# Patient Record
Sex: MALE | Race: WHITE | NOT HISPANIC OR LATINO | URBAN - METROPOLITAN AREA
[De-identification: names, ages, dates, MRNs, and addresses within clinical notes are randomized per-mention and may not be internally consistent; named-entity substitution may affect disease eponyms.]

---

## 2023-09-08 ENCOUNTER — OFFICE VISIT (OUTPATIENT)
Dept: URBAN - METROPOLITAN AREA SURGERY CENTER 15 | Facility: SURGERY CENTER | Age: 48
End: 2023-09-08
Payer: COMMERCIAL

## 2023-09-08 ENCOUNTER — CLAIMS CREATED FROM THE CLAIM WINDOW (OUTPATIENT)
Dept: URBAN - METROPOLITAN AREA CLINIC 4 | Facility: CLINIC | Age: 48
End: 2023-09-08
Payer: COMMERCIAL

## 2023-09-08 DIAGNOSIS — D12.3 ADENOMA OF TRANSVERSE COLON: ICD-10-CM

## 2023-09-08 DIAGNOSIS — Z12.11 COLON CANCER SCREENING: ICD-10-CM

## 2023-09-08 DIAGNOSIS — K63.89 OTHER SPECIFIED DISEASES OF INTESTINE: ICD-10-CM

## 2023-09-08 DIAGNOSIS — K63.5 BENIGN COLON POLYPS: ICD-10-CM

## 2023-09-08 DIAGNOSIS — K63.89 APPENDICITIS EPIPLOICA: ICD-10-CM

## 2023-09-08 DIAGNOSIS — K64.4 INTERNAL AND EXTERNAL HEMORRHOIDS WITHOUT COMPLICATION: ICD-10-CM

## 2023-09-08 DIAGNOSIS — Z12.11 COLON CANCER SCREENING (HIGH RISK): ICD-10-CM

## 2023-09-08 DIAGNOSIS — K62.1 ANAL AND RECTAL POLYP: ICD-10-CM

## 2023-09-08 PROCEDURE — G8907 PT DOC NO EVENTS ON DISCHARG: HCPCS | Performed by: INTERNAL MEDICINE

## 2023-09-08 PROCEDURE — 88305 TISSUE EXAM BY PATHOLOGIST: CPT | Performed by: PATHOLOGY

## 2023-09-08 PROCEDURE — 45385 COLONOSCOPY W/LESION REMOVAL: CPT | Performed by: INTERNAL MEDICINE

## 2023-09-08 PROCEDURE — 45384 COLONOSCOPY W/LESION REMOVAL: CPT | Performed by: INTERNAL MEDICINE

## 2023-09-08 PROCEDURE — 45381 COLONOSCOPY SUBMUCOUS NJX: CPT | Performed by: INTERNAL MEDICINE

## 2023-09-08 PROCEDURE — 00811 ANES LWR INTST NDSC NOS: CPT | Performed by: NURSE ANESTHETIST, CERTIFIED REGISTERED

## 2025-03-24 ENCOUNTER — OFFICE VISIT (OUTPATIENT)
Dept: URBAN - METROPOLITAN AREA CLINIC 78 | Facility: CLINIC | Age: 50
End: 2025-03-24
Payer: COMMERCIAL

## 2025-03-24 ENCOUNTER — DASHBOARD ENCOUNTERS (OUTPATIENT)
Age: 50
End: 2025-03-24

## 2025-03-24 DIAGNOSIS — K20.0 EOSINOPHILIC ESOPHAGITIS: ICD-10-CM

## 2025-03-24 DIAGNOSIS — R13.19 ESOPHAGEAL DYSPHAGIA: ICD-10-CM

## 2025-03-24 PROCEDURE — 99214 OFFICE O/P EST MOD 30 MIN: CPT | Performed by: INTERNAL MEDICINE

## 2025-03-24 RX ORDER — OMEPRAZOLE 40 MG/1
1 CAPSULE 1/2 TO 1 HOUR BEFORE MORNING MEAL CAPSULE, DELAYED RELEASE ORAL TWICE A DAY
Qty: 120 | Refills: 0 | OUTPATIENT
Start: 2025-03-24

## 2025-03-24 RX ORDER — BUDESONIDE 1 MG/2ML
1 ML SUSPENSION RESPIRATORY (INHALATION) TWICE A DAY
Qty: 60 ML | Refills: 0 | OUTPATIENT
Start: 2025-03-24

## 2025-03-24 RX ORDER — VALACYCLOVIR 500 MG/1
TAKE ONE TABLET BY MOUTH ONE TIME DAILY FOR SUPPRESSION OF RECCURENT COLD SORES TABLET ORAL
Qty: 30 UNSPECIFIED | Refills: 5 | Status: ACTIVE | COMMUNITY

## 2025-03-24 NOTE — HPI-TODAY'S VISIT:
Yusef Escobedo is a 49-year-old male who was diagnosed with eosinophilic esophagitis (EOE) several years ago. He reports using Flovent to manage his symptoms, particularly when he feels food getting stuck. He typically uses two puffs in the morning and two in the evening for three to five days, which alleviates his symptoms. However, he expresses concern about the cost of Flovent and inquires about alternative treatments. Yusef describes episodes where food gets stuck, leading to dry heaving and difficulty swallowing, which can last for hours. He recalls a particularly severe episode that required a visit to the ER, where he was given a muscle relaxer that provided instant relief. Yusef mentions having been stretched twice, once by Dr. Santos, who advised against further stretching due to the risk of esophageal perforation. He has been living with this condition since he was 15 years old and has been advised to take smaller bites, although this does not seem to help. Yusef is interested in exploring other treatment options that might prevent these episodes and improve his quality of life.

## 2025-04-08 ENCOUNTER — CLAIMS CREATED FROM THE CLAIM WINDOW (OUTPATIENT)
Dept: URBAN - METROPOLITAN AREA SURGERY CENTER 15 | Facility: SURGERY CENTER | Age: 50
End: 2025-04-08
Payer: COMMERCIAL

## 2025-04-08 ENCOUNTER — CLAIMS CREATED FROM THE CLAIM WINDOW (OUTPATIENT)
Dept: URBAN - METROPOLITAN AREA CLINIC 4 | Facility: CLINIC | Age: 50
End: 2025-04-08
Payer: COMMERCIAL

## 2025-04-08 DIAGNOSIS — K22.89 DILATION OF ESOPHAGUS: ICD-10-CM

## 2025-04-08 DIAGNOSIS — K21.9 GASTRO-ESOPHAGEAL REFLUX DISEASE WITHOUT ESOPHAGITIS: ICD-10-CM

## 2025-04-08 DIAGNOSIS — R13.19 CERVICAL DYSPHAGIA: ICD-10-CM

## 2025-04-08 DIAGNOSIS — K21.9 ACID REFLUX: ICD-10-CM

## 2025-04-08 DIAGNOSIS — K31.89 OTHER DISEASES OF STOMACH AND DUODENUM: ICD-10-CM

## 2025-04-08 PROCEDURE — 88305 TISSUE EXAM BY PATHOLOGIST: CPT | Performed by: PATHOLOGY

## 2025-04-08 PROCEDURE — 43249 ESOPH EGD DILATION <30 MM: CPT | Performed by: INTERNAL MEDICINE

## 2025-04-08 PROCEDURE — 88342 IMHCHEM/IMCYTCHM 1ST ANTB: CPT | Performed by: PATHOLOGY

## 2025-04-08 PROCEDURE — 00731 ANES UPR GI NDSC PX NOS: CPT | Performed by: NURSE ANESTHETIST, CERTIFIED REGISTERED

## 2025-04-08 PROCEDURE — 43239 EGD BIOPSY SINGLE/MULTIPLE: CPT | Performed by: INTERNAL MEDICINE

## 2025-04-08 RX ORDER — VALACYCLOVIR 500 MG/1
TAKE ONE TABLET BY MOUTH ONE TIME DAILY FOR SUPPRESSION OF RECCURENT COLD SORES TABLET ORAL
Qty: 30 UNSPECIFIED | Refills: 5 | Status: ACTIVE | COMMUNITY

## 2025-04-08 RX ORDER — OMEPRAZOLE 40 MG/1
1 CAPSULE 1/2 TO 1 HOUR BEFORE MORNING MEAL CAPSULE, DELAYED RELEASE ORAL TWICE A DAY
Qty: 120 | Refills: 0 | Status: ACTIVE | COMMUNITY
Start: 2025-03-24

## 2025-04-08 RX ORDER — BUDESONIDE 1 MG/2ML
1 ML SUSPENSION RESPIRATORY (INHALATION) TWICE A DAY
Qty: 60 ML | Refills: 0 | Status: ACTIVE | COMMUNITY
Start: 2025-03-24

## 2025-04-09 ENCOUNTER — OFFICE VISIT (OUTPATIENT)
Dept: URBAN - METROPOLITAN AREA CLINIC 78 | Facility: CLINIC | Age: 50
End: 2025-04-09
Payer: COMMERCIAL

## 2025-04-09 ENCOUNTER — TELEPHONE ENCOUNTER (OUTPATIENT)
Dept: URBAN - METROPOLITAN AREA CLINIC 78 | Facility: CLINIC | Age: 50
End: 2025-04-09

## 2025-04-09 DIAGNOSIS — K22.4 ESOPHAGEAL SPASM: ICD-10-CM

## 2025-04-09 DIAGNOSIS — K20.80 ABSCESS OF ESOPHAGUS: ICD-10-CM

## 2025-04-09 DIAGNOSIS — R13.19 ESOPHAGEAL DYSPHAGIA: ICD-10-CM

## 2025-04-09 PROBLEM — 266434009: Status: ACTIVE | Noted: 2025-04-09

## 2025-04-09 PROCEDURE — 99213 OFFICE O/P EST LOW 20 MIN: CPT | Performed by: INTERNAL MEDICINE

## 2025-04-09 RX ORDER — BUDESONIDE 1 MG/2ML
1 ML SUSPENSION RESPIRATORY (INHALATION) TWICE A DAY
Qty: 60 ML | Refills: 0 | OUTPATIENT
Start: 2025-04-09

## 2025-04-09 RX ORDER — OMEPRAZOLE 40 MG/1
1 CAPSULE 1/2 TO 1 HOUR BEFORE MORNING MEAL CAPSULE, DELAYED RELEASE ORAL TWICE A DAY
Qty: 120 | Refills: 0 | Status: ACTIVE | COMMUNITY
Start: 2025-03-24

## 2025-04-09 RX ORDER — BUDESONIDE 1 MG/2ML
1 ML SUSPENSION RESPIRATORY (INHALATION) TWICE A DAY
Qty: 60 ML | Refills: 0 | Status: ACTIVE | COMMUNITY
Start: 2025-03-24

## 2025-04-09 RX ORDER — OMEPRAZOLE 40 MG/1
1 CAPSULE 1/2 TO 1 HOUR BEFORE MORNING MEAL CAPSULE, DELAYED RELEASE ORAL TWICE A DAY
Qty: 120 | Refills: 0 | OUTPATIENT
Start: 2025-04-09

## 2025-04-09 RX ORDER — VALACYCLOVIR 500 MG/1
TAKE ONE TABLET BY MOUTH ONE TIME DAILY FOR SUPPRESSION OF RECCURENT COLD SORES TABLET ORAL
Qty: 30 UNSPECIFIED | Refills: 5 | Status: ACTIVE | COMMUNITY

## 2025-04-09 NOTE — HPI-TODAY'S VISIT:
Yusef Escobedo is a 49-year-old male who presents with concerns about esophageal symptoms. He reports experiencing chest pains similar to an episode six months ago, which lasted three days and led him to urgent care and subsequently the ER. Cardiac issues were ruled out after a stress test and echo performed by Dr. York, a cardiologist. Yusef mentions consuming Celsius energy drink, scrambled eggs, and a smoothie before the onset of symptoms. He denies any issues with Celsius previously. He expresses concern about esophageal spasms due to a narrowed esophageal tube with ring formation, which was noted during a previous dilation and biopsy procedure. Yusef is anxious about the possibility of cancer, especially given his family history of esophageal cancer. He is reassured that his condition will not progress to cancer. Yusef is currently using budesonide and an acid reducer, and he is considering Dupixent as a targeted therapy for eosinophilic esophagitis. He has scheduled a follow-up appointment in two months.

## 2025-05-07 ENCOUNTER — OFFICE VISIT (OUTPATIENT)
Dept: URBAN - METROPOLITAN AREA CLINIC 78 | Facility: CLINIC | Age: 50
End: 2025-05-07

## 2025-05-08 ENCOUNTER — OFFICE VISIT (OUTPATIENT)
Dept: URBAN - METROPOLITAN AREA CLINIC 78 | Facility: CLINIC | Age: 50
End: 2025-05-08
Payer: COMMERCIAL

## 2025-05-08 DIAGNOSIS — K20.80 ABSCESS OF ESOPHAGUS: ICD-10-CM

## 2025-05-08 PROCEDURE — 99213 OFFICE O/P EST LOW 20 MIN: CPT | Performed by: INTERNAL MEDICINE

## 2025-05-08 RX ORDER — VALACYCLOVIR 500 MG/1
TAKE ONE TABLET BY MOUTH ONE TIME DAILY FOR SUPPRESSION OF RECCURENT COLD SORES TABLET ORAL
Qty: 30 UNSPECIFIED | Refills: 5 | Status: ACTIVE | COMMUNITY

## 2025-05-08 RX ORDER — BUDESONIDE 1 MG/2ML
1 ML SUSPENSION RESPIRATORY (INHALATION) TWICE A DAY
Qty: 60 ML | Refills: 0 | Status: ACTIVE | COMMUNITY
Start: 2025-04-09

## 2025-05-08 RX ORDER — OMEPRAZOLE 40 MG/1
1 CAPSULE 1/2 TO 1 HOUR BEFORE MORNING MEAL CAPSULE, DELAYED RELEASE ORAL TWICE A DAY
Qty: 120 | Refills: 0 | Status: ACTIVE | COMMUNITY
Start: 2025-04-09

## 2025-05-08 NOTE — HPI-TODAY'S VISIT:
Yusef Escobedo is a 49-year-old male who reports ongoing issues with swallowing, which he attributes to eosinophilic esophagitis (EOE). He describes a sensation of food getting stuck and slow passage, which he has experienced since age 15. He notes that his symptoms improve temporarily with the use of Flovent, which he takes for about five days when symptoms arise. He does not use it daily but waits until he feels the slowness again. Yusef mentions that he has been using medication prior to his recent biopsy, which showed squamous mucosa with reflux changes but no eosinophilic esophagitis. 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 He is currently taking budesonide and pantoprazole and is monitoring his symptoms closely. He expresses concern about the possibility of food getting stuck and the painful fight to dislodge it, noting that he sometimes uses milk or water to help, though it often circulates back up. Yusef is aware of certain trigger foods and textures that exacerbate his symptoms and is mindful of avoiding them. He is advised to continue with pantoprazole and to be vigilant about his symptoms, seeking medical attention before they become severe.